# Patient Record
Sex: FEMALE | Race: OTHER | HISPANIC OR LATINO | URBAN - METROPOLITAN AREA
[De-identification: names, ages, dates, MRNs, and addresses within clinical notes are randomized per-mention and may not be internally consistent; named-entity substitution may affect disease eponyms.]

---

## 2023-05-05 ENCOUNTER — INPATIENT (INPATIENT)
Facility: HOSPITAL | Age: 38
LOS: 1 days | Discharge: HOME | DRG: 93 | End: 2023-05-07
Attending: PSYCHIATRY & NEUROLOGY | Admitting: PSYCHIATRY & NEUROLOGY
Payer: MEDICAID

## 2023-05-05 VITALS
TEMPERATURE: 99 F | RESPIRATION RATE: 18 BRPM | HEIGHT: 66 IN | HEART RATE: 88 BPM | SYSTOLIC BLOOD PRESSURE: 145 MMHG | DIASTOLIC BLOOD PRESSURE: 94 MMHG | OXYGEN SATURATION: 100 % | WEIGHT: 179.9 LBS

## 2023-05-05 DIAGNOSIS — R20.0 ANESTHESIA OF SKIN: ICD-10-CM

## 2023-05-05 LAB
ALBUMIN SERPL ELPH-MCNC: 4.9 G/DL — SIGNIFICANT CHANGE UP (ref 3.3–5)
ALP SERPL-CCNC: 57 U/L — SIGNIFICANT CHANGE UP (ref 40–120)
ALT FLD-CCNC: 17 U/L — SIGNIFICANT CHANGE UP (ref 10–45)
ANION GAP SERPL CALC-SCNC: 13 MMOL/L — SIGNIFICANT CHANGE UP (ref 5–17)
ANISOCYTOSIS BLD QL: SLIGHT — SIGNIFICANT CHANGE UP
APTT BLD: 30.7 SEC — SIGNIFICANT CHANGE UP (ref 27.5–35.5)
AST SERPL-CCNC: 15 U/L — SIGNIFICANT CHANGE UP (ref 10–40)
BASE EXCESS BLDV CALC-SCNC: -3.6 MMOL/L — LOW (ref -2–3)
BASOPHILS # BLD AUTO: 0.08 K/UL — SIGNIFICANT CHANGE UP (ref 0–0.2)
BASOPHILS NFR BLD AUTO: 0.9 % — SIGNIFICANT CHANGE UP (ref 0–2)
BILIRUB SERPL-MCNC: 0.2 MG/DL — SIGNIFICANT CHANGE UP (ref 0.2–1.2)
BUN SERPL-MCNC: 9 MG/DL — SIGNIFICANT CHANGE UP (ref 7–23)
CA-I SERPL-SCNC: 1.26 MMOL/L — SIGNIFICANT CHANGE UP (ref 1.15–1.33)
CALCIUM SERPL-MCNC: 9.7 MG/DL — SIGNIFICANT CHANGE UP (ref 8.4–10.5)
CHLORIDE BLDV-SCNC: 99 MMOL/L — SIGNIFICANT CHANGE UP (ref 96–108)
CHLORIDE SERPL-SCNC: 101 MMOL/L — SIGNIFICANT CHANGE UP (ref 96–108)
CO2 BLDV-SCNC: 24 MMOL/L — SIGNIFICANT CHANGE UP (ref 22–26)
CO2 SERPL-SCNC: 21 MMOL/L — LOW (ref 22–31)
CREAT SERPL-MCNC: 0.49 MG/DL — LOW (ref 0.5–1.3)
CRP SERPL-MCNC: <3 MG/L — SIGNIFICANT CHANGE UP (ref 0–4)
DACRYOCYTES BLD QL SMEAR: SLIGHT — SIGNIFICANT CHANGE UP
EGFR: 126 ML/MIN/1.73M2 — SIGNIFICANT CHANGE UP
EOSINOPHIL # BLD AUTO: 0.24 K/UL — SIGNIFICANT CHANGE UP (ref 0–0.5)
EOSINOPHIL NFR BLD AUTO: 2.6 % — SIGNIFICANT CHANGE UP (ref 0–6)
ETHANOL SERPL-MCNC: <10 MG/DL — SIGNIFICANT CHANGE UP (ref 0–10)
GAS PNL BLDV: 134 MMOL/L — LOW (ref 136–145)
GAS PNL BLDV: SIGNIFICANT CHANGE UP
GLUCOSE BLDV-MCNC: 73 MG/DL — SIGNIFICANT CHANGE UP (ref 70–99)
GLUCOSE SERPL-MCNC: 95 MG/DL — SIGNIFICANT CHANGE UP (ref 70–99)
HCO3 BLDV-SCNC: 23 MMOL/L — SIGNIFICANT CHANGE UP (ref 22–29)
HCT VFR BLD CALC: 37.3 % — SIGNIFICANT CHANGE UP (ref 34.5–45)
HCT VFR BLDA CALC: 36 % — SIGNIFICANT CHANGE UP (ref 34.5–46.5)
HGB BLD CALC-MCNC: 12 G/DL — SIGNIFICANT CHANGE UP (ref 11.7–16.1)
HGB BLD-MCNC: 11.5 G/DL — SIGNIFICANT CHANGE UP (ref 11.5–15.5)
INR BLD: 1.16 RATIO — SIGNIFICANT CHANGE UP (ref 0.88–1.16)
LACTATE BLDV-MCNC: 2.6 MMOL/L — HIGH (ref 0.5–2)
LYMPHOCYTES # BLD AUTO: 1.76 K/UL — SIGNIFICANT CHANGE UP (ref 1–3.3)
LYMPHOCYTES # BLD AUTO: 19.3 % — SIGNIFICANT CHANGE UP (ref 13–44)
MAGNESIUM SERPL-MCNC: 2 MG/DL — SIGNIFICANT CHANGE UP (ref 1.6–2.6)
MANUAL SMEAR VERIFICATION: SIGNIFICANT CHANGE UP
MCHC RBC-ENTMCNC: 24.4 PG — LOW (ref 27–34)
MCHC RBC-ENTMCNC: 30.8 GM/DL — LOW (ref 32–36)
MCV RBC AUTO: 79.2 FL — LOW (ref 80–100)
MICROCYTES BLD QL: SLIGHT — SIGNIFICANT CHANGE UP
MONOCYTES # BLD AUTO: 0.32 K/UL — SIGNIFICANT CHANGE UP (ref 0–0.9)
MONOCYTES NFR BLD AUTO: 3.5 % — SIGNIFICANT CHANGE UP (ref 2–14)
NEUTROPHILS # BLD AUTO: 6.73 K/UL — SIGNIFICANT CHANGE UP (ref 1.8–7.4)
NEUTROPHILS NFR BLD AUTO: 73.7 % — SIGNIFICANT CHANGE UP (ref 43–77)
OVALOCYTES BLD QL SMEAR: SLIGHT — SIGNIFICANT CHANGE UP
PCO2 BLDV: 45 MMHG — HIGH (ref 39–42)
PH BLDV: 7.31 — LOW (ref 7.32–7.43)
PHOSPHATE SERPL-MCNC: 3.9 MG/DL — SIGNIFICANT CHANGE UP (ref 2.5–4.5)
PLAT MORPH BLD: NORMAL — SIGNIFICANT CHANGE UP
PLATELET # BLD AUTO: 216 K/UL — SIGNIFICANT CHANGE UP (ref 150–400)
PO2 BLDV: 39 MMHG — SIGNIFICANT CHANGE UP (ref 25–45)
POIKILOCYTOSIS BLD QL AUTO: SLIGHT — SIGNIFICANT CHANGE UP
POLYCHROMASIA BLD QL SMEAR: SLIGHT — SIGNIFICANT CHANGE UP
POTASSIUM BLDV-SCNC: 4 MMOL/L — SIGNIFICANT CHANGE UP (ref 3.5–5.1)
POTASSIUM SERPL-MCNC: 3.9 MMOL/L — SIGNIFICANT CHANGE UP (ref 3.5–5.3)
POTASSIUM SERPL-SCNC: 3.9 MMOL/L — SIGNIFICANT CHANGE UP (ref 3.5–5.3)
PROT SERPL-MCNC: 7.9 G/DL — SIGNIFICANT CHANGE UP (ref 6–8.3)
PROTHROM AB SERPL-ACNC: 13.5 SEC — HIGH (ref 10.5–13.4)
RBC # BLD: 4.71 M/UL — SIGNIFICANT CHANGE UP (ref 3.8–5.2)
RBC # FLD: SIGNIFICANT CHANGE UP (ref 10.3–14.5)
RBC BLD AUTO: ABNORMAL
SAO2 % BLDV: 60.2 % — LOW (ref 67–88)
SODIUM SERPL-SCNC: 135 MMOL/L — SIGNIFICANT CHANGE UP (ref 135–145)
TROPONIN T, HIGH SENSITIVITY RESULT: 6 NG/L — SIGNIFICANT CHANGE UP (ref 0–51)
WBC # BLD: 9.13 K/UL — SIGNIFICANT CHANGE UP (ref 3.8–10.5)
WBC # FLD AUTO: 9.13 K/UL — SIGNIFICANT CHANGE UP (ref 3.8–10.5)

## 2023-05-05 PROCEDURE — 99291 CRITICAL CARE FIRST HOUR: CPT

## 2023-05-05 PROCEDURE — 70496 CT ANGIOGRAPHY HEAD: CPT | Mod: 26,MA

## 2023-05-05 PROCEDURE — 0042T: CPT | Mod: MA

## 2023-05-05 PROCEDURE — 70498 CT ANGIOGRAPHY NECK: CPT | Mod: 26,MA

## 2023-05-05 RX ORDER — METOCLOPRAMIDE HCL 10 MG
10 TABLET ORAL ONCE
Refills: 0 | Status: COMPLETED | OUTPATIENT
Start: 2023-05-05 | End: 2023-05-05

## 2023-05-05 RX ORDER — ENOXAPARIN SODIUM 100 MG/ML
40 INJECTION SUBCUTANEOUS EVERY 24 HOURS
Refills: 0 | Status: DISCONTINUED | OUTPATIENT
Start: 2023-05-05 | End: 2023-05-07

## 2023-05-05 RX ORDER — ASPIRIN/CALCIUM CARB/MAGNESIUM 324 MG
81 TABLET ORAL ONCE
Refills: 0 | Status: COMPLETED | OUTPATIENT
Start: 2023-05-05 | End: 2023-05-05

## 2023-05-05 RX ADMIN — Medication 10 MILLIGRAM(S): at 17:29

## 2023-05-05 RX ADMIN — Medication 81 MILLIGRAM(S): at 17:29

## 2023-05-05 NOTE — H&P ADULT - ATTENDING COMMENTS
Ms. Lima is a 37 year old right handed woman with a PMHx significant for anemia has presented to the ED for bilateral hands feels cold and face was feeling host and transient episode of L lower facial heaviness. During my evaluation, patient's boyfriend was present. I appreciate that. Patient reported she is feeling normal back to baseline. No new episodes or new neurology symptoms reported.  On further, questioning, patient confirmed that  she denies personal and family history of autoimmune disease, including MS, NMO antibody disorder or other neurologic disorder.    Detailed neuro exam:  General: Patient is comfortably lying on the bed without acute distress.  Mental and Psychological Status: The patient is alert and oriented to person, place and exact date. Follows simple and complex commands. Speech is fluent, comprehension, naming and repetition are intact. Relates personal medical history clearly and with detail.  Cranial Nerve Exam: Visual fields are full to confrontation. Pupils are round, equal, and reactive. Extraocular movements are full. V1-V3 are intact to light touch. There is no facial weakness or asymmetry. Hearing is grossly intact. Palate elevation is symmetric. Shoulder shrug is 5/5 bilaterally. Tongue protrusion is midline. There is no dysarthria.  Motor Exam: Bulk, tone, and strength are normal. There is no pronator drift. There are no resting tremors.  Sensory Examination: Sensation is intact to light touch throughout.  Deep Tendon Reflexes and Plantar Responses: 2+ throughout.   Posture, Station and Gait: Station and gait, including heel and toe walking are normal. Posture are normal.  Coordination: There is no dysmetria or ataxia with finger-nose-finger or heel-knee-shin. No intention tremors are present.  Ms. Lima is a 37 year old right handed woman with a PMHx significant for anemia has presented to the ED for bilateral hands feels cold and face was feeling host and transient episode of L lower facial heaviness. Etiology of these symptoms remains uncertain. Clinically less suspicious for stroke or Demyelinating disease.  Clinically she is back to baseline. I advised for out patient MRI brain but she would like do as inpatient before discharge.  No need for MRI C/T spine,   MRI Brain,   Continue medical management, neuro- check and fall precaution.  GI and DVT prophylaxis    I discussed the diagnosis, treatment plan with the patient. All questions and concerns were addressed. The patient demonstrated good understanding of the treatment plan.

## 2023-05-05 NOTE — ED ADULT NURSE NOTE - NIH STROKE SCALE: 8. SENSORY, QM
[Normal LUE] : Left Upper Extremity: No scars, rashes, lesions, ulcers, skin intact [Normal Finger/nose] : finger to nose coordination [Normal] : no peripheral adenopathy appreciated [de-identified] : left hand:\par Skin intact moderate swelling of the thumb and index fingers\par Deformity of the digits consistent with arthritis/degenerative changes\par Range of motion of the digits slightly limited, at baseline\par Neurovascular intact distally [de-identified] : x-rays from an urgent care I reviewed there is a age indeterminate fracture of the second metacarpal head as well as subluxation of the thumb MCP joint [de-identified] : iraisr (1) Mild-to-moderate sensory loss; patient feels pinprick is less sharp or is dull on the affected side; or there is a loss of superficial pain with pinprick, but patient is aware of being touched

## 2023-05-05 NOTE — H&P ADULT - NSHPPHYSICALEXAM_GEN_ALL_CORE
Physical Examination:   General - NAD  Cardiovascular - Peripheral pulses palpable, no edema  Eyes -  Fundoscopy not performed due to safety precautions in the setting of the COVID-19 pandemic    Neurologic Exam:  Mental status - Awake, Alert, Oriented to person, place, and time. Speech fluent, repetition and naming intact. Follows simple and complex commands. Attention/concentration, recent and remote memory (including registration and recall), and fund of knowledge intact    Cranial nerves - PERRLA, VFF, EOMI, decreased L V2/V3 sensation,  L nasiolabial fold flattening noted, hearing intact b/l, palate with symmetric elevation,  sternocleidomastiod 5/5 strength b/l, tongue midline on protrusion with full lateral movement    Motor - Normal bulk and tone throughout. No pronator drift.  Strength testing  No drift in extremities - full strength through out.     Sensation - Decreased to light touch in LLE.     Coordination - Finger to Nose intact b/l. No tremors appreciated    Reflexes +2 throughout, toes down going.    Gait and station - Normal casual gait.

## 2023-05-05 NOTE — H&P ADULT - ASSESSMENT
HPI: Patient ANY ALLRED is a 37y (1985) woman with a PMHx significant for anemia has presented to the ED as a code stroke for L sided paresthesias and L lower facial heaviness. PT states she noticed at 9 AM 5/5/23 (LKW) that her left hand went numb for 5 minutes after which it resolved. Then at 2 PM, she states she felt the left side of her face being heavy and since then had this symptom. Pt denies any recent HA, n/v, changes in vision/hearing, trauma. Not on AC/AP at home. No hx of headaches. Pt is not a tpa candidate as her symptoms are nondisabling and pt outside tenecteplase window. O    Of note, pt states that she had similar symptoms in the past for which she went to [facility] and has CT head there (reportedly negative, per her). Pt states she never had an MRI of her brain.     NIH 2 (L lower facial droop- mild, numbness left side- V2/V3 and left leg)  preMRS 0    CT head negative for acute findings per radiology.     Impression: L sided sensory motor symptoms possibly 2/2 demyelinating disease vs. hemiplegic migraine (w/o HA) vs R brain stroke  vs. other etiology. Unclear mechanism at this time. Given young age and no stroke risk factors and reported recurrent episodes, acute stroke may be possibly lower on the differential but cannot absolutely rule out at this time.     Recommendations:  -Admit to general neurology under Dr. Tom  Imaging:  -MRI brain w and w/o contrast, C/T/L spine w and w/o contrast, if no contraindications  -Can consider STAT CT head non-contrast for any change in neuro exam    Secondary prevention of stroke:  -One dose ASA now.  -Tight glucose control (long-term goal HgbA1c < 6%)    Misc/additional recs:  -toxic/metabolic/infectious w/u per primary team  -Dysphagia screen  -Speech and swallow eval if dysphagia screen fails  -NPO except meds until dysphagia screen passed  -Head of bed > 30 degrees for aspiration prevention   -Continuous  telemetry to monitor for arrhythmia  -Stroke labwork: HgbA1C, fasting lipid panel, CBC, CMP, coag pannel, troponin  -Neuro checks Q4  -PT/OT  -DVT Prophylaxis: Lovenox 40 mg Sq daily unless contraindicated in which case SCDs   Fall precautions, aspiration precautions    Case discussed with stroke fellow and Dr. Carlton. To be discussed with general neurology attending and will be formally staffed by attending during morning rounds. Recommendations will be finalized once signed by attending.        HPI: Patient ANY ALLRED is a 37y (1985) woman with a PMHx significant for anemia has presented to the ED as a code stroke for L sided paresthesias and L lower facial heaviness. PT states she noticed at 9 AM 5/5/23 (LKW) that her left hand went numb for 5 minutes after which it resolved. Then at 2 PM, she states she felt the left side of her face being heavy and since then had this symptom. In addition, since around that time has had LLE numbness. Pt denies any recent HA, n/v, changes in vision/hearing, trauma. Not on AC/AP at home. No hx of headaches. Pt is not a tpa candidate as her symptoms are nondisabling and pt outside tenecteplase window.     Of note, pt states that she had similar symptoms in the past for which she went to [facility] and has CT head there (reportedly negative, per her). Pt states she never had an MRI of her brain.     NIH 2 (L lower facial droop- mild, numbness left side- V2/V3 and left leg)  preMRS 0    CT head negative for acute findings per radiology.     Impression: L sided sensory motor symptoms possibly 2/2 demyelinating disease vs. hemiplegic migraine (w/o HA) vs R brain stroke  vs. other etiology. Unclear mechanism at this time. Given young age and no stroke risk factors and reported recurrent episodes, acute stroke may be possibly lower on the differential but cannot absolutely rule out at this time.     Recommendations:  -Admit to general neurology under Dr. Tom  Imaging:  -MRI brain w and w/o contrast, C/T/L spine w and w/o contrast, if no contraindications  -Can consider STAT CT head non-contrast for any change in neuro exam    Secondary prevention of stroke:  -One dose ASA now.  -Tight glucose control (long-term goal HgbA1c < 6%)    Misc/additional recs:  -toxic/metabolic/infectious w/u per primary team  -Dysphagia screen  -Speech and swallow eval if dysphagia screen fails  -NPO except meds until dysphagia screen passed  -Head of bed > 30 degrees for aspiration prevention   -Continuous  telemetry to monitor for arrhythmia  -Stroke labwork: HgbA1C, fasting lipid panel, CBC, CMP, coag pannel, troponin  -Neuro checks Q4  -PT/OT  -DVT Prophylaxis: Lovenox 40 mg Sq daily unless contraindicated in which case SCDs   Fall precautions, aspiration precautions    Case discussed with stroke fellow and Dr. Carlton. To be discussed with general neurology attending and will be formally staffed by attending during morning rounds. Recommendations will be finalized once signed by attending.

## 2023-05-05 NOTE — H&P ADULT - HISTORY OF PRESENT ILLNESS
HPI: Patient ANY ALLRED is a 37y (1985) woman with a PMHx significant for anemia has presented to the ED as a code stroke for L sided paresthesias and L lower facial heaviness. PT states she noticed at 9 AM 5/5/23 (LKW) that her left hand went numb for 5 minutes after which it resolved. Then at 2 PM, she states she felt the left side of her face being heavy and since then had this symptom. Pt denies any recent HA, n/v, changes in vision/hearing, trauma. Not on AC/AP at home. No hx of headaches. Pt is not a tpa candidate as her symptoms are nondisabling and pt outside tenecteplase window.     Of note, pt states that she had similar symptoms in the past for which she went to [facility] and has CT head there (reportedly negative, per her). Pt states she never had an MRI of her brain.     NIH 2 (L lower facial droop- mild, numbness left side)  preMRS 0 HPI: Patient ANY ALLRED is a 37y (1985) woman with a PMHx significant for anemia has presented to the ED as a code stroke for L sided paresthesias and L lower facial heaviness. PT states she noticed at 9 AM 5/5/23 (LKW) that her left hand went numb for 5 minutes after which it resolved. Then at 2 PM, she states she felt the left side of her face being heavy and since then had this symptom. In addition, since around that time has had LLE numbness. Pt denies any recent HA, n/v, changes in vision/hearing, trauma. Not on AC/AP at home. No hx of headaches. Pt is not a tpa candidate as her symptoms are nondisabling and pt outside tenecteplase window.     Of note, pt states that she had similar symptoms in the past for which she went to [facility] and has CT head there (reportedly negative, per her). Pt states she never had an MRI of her brain.     NIH 2 (L lower facial droop- mild, numbness left side)  preMRS 0

## 2023-05-05 NOTE — ED PROVIDER NOTE - NS ED ROS FT
Gen: Denies fever, weight loss  HEENT: Denies vision changes, ear pain, epistaxis, sore throat  CV: Denies chest pain, palpitations  Skin: Denies rash, erythema, color changes  Resp: Denies SOB, cough  Endo: Denies sensitivity to heat, cold, increased urination  GI: Denies abdominal pain, constipation, nausea, vomiting, diarrhea  Msk: Denies back pain, LE swelling, extremity pain  : Denies dysuria, increased frequency  Neuro: Denies LOC; + weakness, +numbness  Psych: Denies hx of psych, hallucinations  ROS statement: all other ROS negative except as per HPI

## 2023-05-05 NOTE — H&P ADULT - NSHPLABSRESULTS_GEN_ALL_CORE
INTERPRETATION:  CLINICAL INDICATION: Left-sided decreased sensation    5mm axial sections of the brain were obtained from base to vertex,   without the intravenous administration of contrast material. Coronal and   sagittal computer generated reconstructed views are available.    No prior brain imaging is available for comparison        The fourth, third and lateral ventricles are normal size and position.   There is no hemorrhage, mass or shift of the midline structures. There is   normal gray white matter differentiation. Bone window examination is   unremarkable.    CT perfusion:    After the intravenous administration of 50 cc of Omnipaque 300 serial   thin sections were obtained through the brain for the purposes of   evaluating CT perfusion, raw data was sent to the [employer] is software for   postprocessing.    No core infarct or delayed mean transit time is identified.    CBF<30% volume: 0 ml  Tmax> 6.0s volume: 0 ml  Mismatch volume: 0 ml  Mismatch ratio: none    CTA head and neck:        After the intravenous power injection of 70 cc of Omnipaque 300 using a   bolus deanna timing run serial thin sections were obtained through the   neck from the thoracic inlet through the intracranial circulation   centered at the [id number] on a multislice CT scanner reformatted   with coronal and sagittal 2 D-MIP projections, including 3 D   reconstructions using a separate 3D [employer] software workstation.A total   of 120 cc of Omnipaque were intravenously injected. 80 cc were discarded.    The origins of the carotid and vertebral arteries are normal.  The   vertebral arteries are codominant. The carotid bifurcations are normal   bilaterally.     The distal vertebral arteries are well identified as are the   posterior-inferior cerebellar arteries bilaterally. The region of the   vertebral basilar junction is normal. The basilar artery is normal.The   posterior cerebral and superior cerebellar arteries are normal.    Evaluation of the carotid arteries demonstrate normal appearance to the   distal cervical, petrous, cavernous and supraclinoid internal carotid   arteries. The anterior cerebralarteries, anterior communicating artery   and middle cerebral arteries are normal.        There is no evidence of aneurysm, stenosis, or vessel occlusion.     The normal intracranial venous circulation is identified. The left   transverse sinus is dominant. The superior sagittal sinus, internal   cerebral veins, vein of Praveen, straight sinus, transverse sinuses,   sigmoid sinuses and internal jugular veins are normal. Cortical veins are   normal.    IMPRESSION:  Unremarkable noncontrast brain CT.Normal CTA of the head and neck.   Normal CT perfusion.

## 2023-05-05 NOTE — ED PROVIDER NOTE - PROGRESS NOTE DETAILS
Charly Taylor PGY3: CTs unremarkable. Per neuro would like MRI to asses for demyelinating disease. Will admit to general neuro floor.

## 2023-05-05 NOTE — ED PROVIDER NOTE - PHYSICAL EXAMINATION
PHYSICAL EXAM:  GENERAL: non-toxic appearing; in no respiratory distress  HEENT: Atraumatic, Normocephalic, PERRL, EOMs intact b/l w/out deficits, no conjunctival pallor, MMM  NECK: No JVD; FROM  CHEST/LUNG: CTAB no wheezes/rhonchi/rales  HEART: RRR no murmur/gallops/rubs  ABDOMEN: +BS, soft, NT, ND  EXTREMITIES: No LE edema, +2 radial pulses b/l, +2 DP/PT pulses b/l  MUSCULOSKELETAL: FROM of all 4 extremities  NERVOUS SYSTEM:  A&Ox3, decreased sensation in LLE; Lt facial droop; nml strength throughout.   SKIN:  No new rashes

## 2023-05-05 NOTE — ED ADULT NURSE NOTE - OBJECTIVE STATEMENT
37 year old female coming in for facial numbness. As per patient at 9:30 this morning she developed numbness in her left hand which only lasted about 5 minutes. Then around 2pm the patient noted left sided facial numbness and heaviness. On arrival a code stroke was called @1540. No shortness of breath or difficulty breathing. No chest pain, pressure or palpitations. No abdominal pain, nausea or vomiting. No fever, chills, or body aches.

## 2023-05-05 NOTE — ED ADULT TRIAGE NOTE - CHIEF COMPLAINT QUOTE
2PM left sided facial droop , resolved after 5 min, left sided facial & arm numbness, left eyelid "heaviness"

## 2023-05-05 NOTE — CONSULT NOTE ADULT - SUBJECTIVE AND OBJECTIVE BOX
Neurology - Consult Note    -  Spectra: [phone] ([facility]), [phone] ([facility])  -    HPI: Patient ANY ALLRED is a 37y (1985) woman with a PMHx significant for anemia has presented to the ED as a code stroke for L sided paresthesias and L lower facial heaviness. PT states she noticed at 9 AM 5/5/23 (LKW) that her left hand went numb for 5 minutes after which it resolved. Then at 2 PM, she states she felt the left side of her face being heavy and since then had this symptom. Pt denies any recent HA, n/v, changes in vision/hearing, trauma. Not on AC/AP at home. Pt is not a tpa candidate as her symptoms are nondisabling and pt outside tenecteplase window. O    Of note, pt states that she had similar symptoms in the past for which she went to [facility] and has CT head there (reportedly negative, per her). Pt states she never had an MRI of her brain.     NIH 2  preMRS 0    Review of Systems:   CONSTITUTIONAL: No fevers or chills  EYES AND ENT: No visual changes or no throat pain   NEUROLOGICAL: +As stated in HPI above  SKIN: No itching, burning, rashes, or lesions   All other review of systems is negative unless indicated above.    Allergies:  No Known Allergies      PMHx/PSHx/Family Hx: As above, otherwise see below       Social Hx:  No current use of tobacco, alcohol, or illicit drugs    Medications:  MEDICATIONS  (STANDING):    MEDICATIONS  (PRN):      Vitals:  T(C): 37.3 (05-05-23 @ 15:39), Max: 37.3 (05-05-23 @ 15:39)  HR: 88 (05-05-23 @ 15:39) (88 - 88)  BP: 145/94 (05-05-23 @ 15:39) (145/94 - 145/94)  RR: 18 (05-05-23 @ 15:39) (18 - 18)  SpO2: 100% (05-05-23 @ 15:39) (100% - 100%)    Physical Examination:   General - NAD  Cardiovascular - Peripheral pulses palpable, no edema  Eyes -  Fundoscopy not performed due to safety precautions in the setting of the COVID-19 pandemic    Neurologic Exam:  Mental status - Awake, Alert, Oriented to person, place, and time. Speech fluent, repetition and naming intact. Follows simple and complex commands. Attention/concentration, recent and remote memory (including registration and recall), and fund of knowledge intact    Cranial nerves - PERRLA, VFF, EOMI, decreased L V2/V3 sensation,  L nasiolabial fold flattening noted, hearing intact b/l, palate with symmetric elevation,  sternocleidomastiod 5/5 strength b/l, tongue midline on protrusion with full lateral movement    Motor - Normal bulk and tone throughout. No pronator drift.  Strength testing  No drift in extremities - full strength through out.     Sensation - Decreased to light touch in LLE.     Coordination - Finger to Nose intact b/l. No tremors appreciated    Gait and station - Normal casual gait.     Labs:          CAPILLARY BLOOD GLUCOSE      POCT Blood Glucose.: 97 mg/dL (05 May 2023 15:38)          CSF:                  Radiology:     Neurology - Consult Note    -  Spectra: [phone] ([facility]), [phone] ([facility])  -    HPI: Patient ANY ALLRED is a 37y (1985) woman with a PMHx significant for anemia has presented to the ED as a code stroke for L sided paresthesias and L lower facial heaviness. PT states she noticed at 9 AM 5/5/23 (LKW) that her left hand went numb for 5 minutes after which it resolved. Then at 2 PM, she states she felt the left side of her face being heavy and since then had this symptom. Pt denies any recent HA, n/v, changes in vision/hearing, trauma. Not on AC/AP at home. No hx of headaches. Pt is not a tpa candidate as her symptoms are nondisabling and pt outside tenecteplase window.     Of note, pt states that she had similar symptoms in the past for which she went to [facility] and has CT head there (reportedly negative, per her). Pt states she never had an MRI of her brain.     NIH 2  preMRS 0    Review of Systems:   CONSTITUTIONAL: No fevers or chills  EYES AND ENT: No visual changes or no throat pain   NEUROLOGICAL: +As stated in HPI above  SKIN: No itching, burning, rashes, or lesions   All other review of systems is negative unless indicated above.    Allergies:  No Known Allergies      PMHx/PSHx/Family Hx: As above, otherwise see below       Social Hx:  No current use of tobacco, alcohol, or illicit drugs    Medications:  MEDICATIONS  (STANDING):    MEDICATIONS  (PRN):      Vitals:  T(C): 37.3 (05-05-23 @ 15:39), Max: 37.3 (05-05-23 @ 15:39)  HR: 88 (05-05-23 @ 15:39) (88 - 88)  BP: 145/94 (05-05-23 @ 15:39) (145/94 - 145/94)  RR: 18 (05-05-23 @ 15:39) (18 - 18)  SpO2: 100% (05-05-23 @ 15:39) (100% - 100%)    Physical Examination:   General - NAD  Cardiovascular - Peripheral pulses palpable, no edema  Eyes -  Fundoscopy not performed due to safety precautions in the setting of the COVID-19 pandemic    Neurologic Exam:  Mental status - Awake, Alert, Oriented to person, place, and time. Speech fluent, repetition and naming intact. Follows simple and complex commands. Attention/concentration, recent and remote memory (including registration and recall), and fund of knowledge intact    Cranial nerves - PERRLA, VFF, EOMI, decreased L V2/V3 sensation,  L nasiolabial fold flattening noted, hearing intact b/l, palate with symmetric elevation,  sternocleidomastiod 5/5 strength b/l, tongue midline on protrusion with full lateral movement    Motor - Normal bulk and tone throughout. No pronator drift.  Strength testing  No drift in extremities - full strength through out.     Sensation - Decreased to light touch in LLE.     Coordination - Finger to Nose intact b/l. No tremors appreciated    Gait and station - Normal casual gait.     Labs:          CAPILLARY BLOOD GLUCOSE      POCT Blood Glucose.: 97 mg/dL (05 May 2023 15:38)          CSF:                  Radiology:

## 2023-05-05 NOTE — ED PROVIDER NOTE - ATTENDING CONTRIBUTION TO CARE
used, Randy #[id number] at 1531.   Pt presents, daughter at bedside, with 2 episodes of numbness, first episode 6hrs prior to arrival, resolved after 5 minutes, involved L hand with numbness, resolved on its own.  Then 1hr prior to arrival, noted left lower face numbness, L eyelid (couldn't understand symptom), and L arm numbness.  This has improved since then.  No diplopia, dysphagia, dysarthria, ataxia, focal weakness in arm or leg.     PE: see neuro exam, done concurrently with ED team during code stroke.  Pt awake, alert, NAD, speech clear, CN intact, no respiratory distress.     MDM: acute L side sensory change, not a candidate for thrombolytics due to onset >4.5hrs, cth, ct angio, neuro consult, check cbc r/o anemia or leukocytosis, check bmp to r/o metabolic derangement and lyte imbalance.

## 2023-05-05 NOTE — ED PROVIDER NOTE - OBJECTIVE STATEMENT
36 y/o F, PMH of anemia, presents to ED as code stroke for L-sided parasthesias and facial numbness. LKW was ar 9 AM; states at that time first noticed Lt hand numbness for ~5min before self-resolving. Then at 2PM she states Lt side of face became heavy and numb, but also noted LLE numbness. Pt denies HA, vision changes, CP, SOB, abd pain, n/v/d, urinary sx, or any other symptoms at this time.

## 2023-05-05 NOTE — ED PROVIDER NOTE - CLINICAL SUMMARY MEDICAL DECISION MAKING FREE TEXT BOX
36 y/o F, PMH of anemia, presents to ED as code stroke for L-sided parasthesias and facial numbness. LKW was ar 9 AM; No AC.    VSS. Physical exam significant for Lt facial droop and LLE decreased sensation.    Neuro at bedside for eval. Will perform stroke w/u. Pt not a TNK candidate at this time. Dispo per results and neuro recs.

## 2023-05-06 LAB
ANION GAP SERPL CALC-SCNC: 12 MMOL/L — SIGNIFICANT CHANGE UP (ref 5–17)
BUN SERPL-MCNC: 7 MG/DL — SIGNIFICANT CHANGE UP (ref 7–23)
CALCIUM SERPL-MCNC: 9.5 MG/DL — SIGNIFICANT CHANGE UP (ref 8.4–10.5)
CHLORIDE SERPL-SCNC: 104 MMOL/L — SIGNIFICANT CHANGE UP (ref 96–108)
CO2 SERPL-SCNC: 22 MMOL/L — SIGNIFICANT CHANGE UP (ref 22–31)
CREAT SERPL-MCNC: 0.5 MG/DL — SIGNIFICANT CHANGE UP (ref 0.5–1.3)
EGFR: 125 ML/MIN/1.73M2 — SIGNIFICANT CHANGE UP
GLUCOSE SERPL-MCNC: 70 MG/DL — SIGNIFICANT CHANGE UP (ref 70–99)
HCT VFR BLD CALC: 36.2 % — SIGNIFICANT CHANGE UP (ref 34.5–45)
HGB BLD-MCNC: 11.3 G/DL — LOW (ref 11.5–15.5)
MCHC RBC-ENTMCNC: 25.3 PG — LOW (ref 27–34)
MCHC RBC-ENTMCNC: 31.2 GM/DL — LOW (ref 32–36)
MCV RBC AUTO: 81.2 FL — SIGNIFICANT CHANGE UP (ref 80–100)
NRBC # BLD: 0 /100 WBCS — SIGNIFICANT CHANGE UP (ref 0–0)
PLATELET # BLD AUTO: 205 K/UL — SIGNIFICANT CHANGE UP (ref 150–400)
POTASSIUM SERPL-MCNC: 3.9 MMOL/L — SIGNIFICANT CHANGE UP (ref 3.5–5.3)
POTASSIUM SERPL-SCNC: 3.9 MMOL/L — SIGNIFICANT CHANGE UP (ref 3.5–5.3)
RBC # BLD: 4.46 M/UL — SIGNIFICANT CHANGE UP (ref 3.8–5.2)
RBC # FLD: SIGNIFICANT CHANGE UP (ref 10.3–14.5)
SODIUM SERPL-SCNC: 138 MMOL/L — SIGNIFICANT CHANGE UP (ref 135–145)
WBC # BLD: 8.4 K/UL — SIGNIFICANT CHANGE UP (ref 3.8–10.5)
WBC # FLD AUTO: 8.4 K/UL — SIGNIFICANT CHANGE UP (ref 3.8–10.5)

## 2023-05-06 PROCEDURE — 99222 1ST HOSP IP/OBS MODERATE 55: CPT | Mod: GC

## 2023-05-06 NOTE — PHYSICAL THERAPY INITIAL EVALUATION ADULT - ACTIVE RANGE OF MOTION EXAMINATION, REHAB EVAL
pablito. upper extremity Active ROM was WNL (within normal limits)/bilateral lower extremity Active ROM was WNL (within normal limits)

## 2023-05-06 NOTE — PHYSICAL THERAPY INITIAL EVALUATION ADULT - ADDITIONAL COMMENTS
Pt reports she lives with spouse and three teenage daughters in house with flight of stairs +HR. Prior to admission pt independent with all functional mobility including ambulation without AD.

## 2023-05-06 NOTE — PHYSICAL THERAPY INITIAL EVALUATION ADULT - PERTINENT HX OF CURRENT PROBLEM, REHAB EVAL
37y female with PMHx significant for anemia presents to ED as a code stroke for L sided paresthesias and L lower facial heaviness. PT states she noticed at 9 AM 5/5/23 (LKW) that her left hand went numb for 5 minutes after which it resolved. Then at 2 PM, she states she felt the left facial "heaviness" and LLE numbness. Pt denies any recent HA, n/v, changes in vision/hearing, trauma. Not on AC/AP at home. No hx of headaches. Pt is not a tpa candidate as her symptoms are nondisabling and pt outside tenecteplase window. NIH 2 (L lower facial droop- mild, numbness left side- V2/V3 and left leg), preMRS 0.  Of note, pt states that she had similar symptoms in the past for which she went to [facility] and had CT head.  Per Neuro, L sided sensory motor symptoms possibly due to demyelinating disease vs. hemiplegic migraine (w/o HA) vs R brain stroke  vs. other etiology.    5/0/5/2023: CT BRAIN: Unremarkable noncontrast brain CT. Normal CTA of the head and neck. Normal CT perfusion.

## 2023-05-06 NOTE — OCCUPATIONAL THERAPY INITIAL EVALUATION ADULT - PERTINENT HX OF CURRENT PROBLEM, REHAB EVAL
36 y/o F, PMH of anemia, presents to ED as code stroke for L-sided parasthesias and facial numbness. LKW was ar 9 AM; No AC. VSS. Physical exam significant for Lt facial droop and LLE decreased sensation.    Of note, pt states that she had similar symptoms in the past for which she went to [facility] and has CT head there (reportedly negative, per her). Pt states she never had an MRI of her brain.     NIH 2 (L lower facial droop- mild, numbness left side- V2/V3 and left leg)  preMRS 0    CT head negative for acute findings per radiology.

## 2023-05-06 NOTE — PATIENT PROFILE ADULT - FALL HARM RISK - HARM RISK INTERVENTIONS

## 2023-05-06 NOTE — CHART NOTE - NSCHARTNOTEFT_GEN_A_CORE
Neurology    Patient seen evaluated bedside with neuro attending Dr Tom, who will attest prior day H&P with today's plan. Patient reports symptoms have remained resolved while being here. Given neuro exam is grossly unrevealing now, will obtain MRI brain while inpatient and defer C/T/L spine after discussion with neuro attending. Will therefore hold off on steroids/ antiplatelet agents given unclear etiology of resolved symptoms in setting of unrevealing neuro exam today. Discussed with patient and amenable with plan. Neurology    Patient seen evaluated bedside with neuro attending Dr Tom, who will attest prior day H&P with today's plan. Patient reports symptoms have remained resolved while being here. Given neuro exam is grossly unrevealing now, will obtain MRI brain while inpatient and defer C/T/L spine after discussion with neuro attending. Will therefore hold off on steroids/ antiplatelet agents given unclear etiology of resolved symptoms in setting of unrevealing neuro exam today. Discussed with patient and amenable with plan. Explained patient care again with  [phone] and answered all her questions. Reviewed bloodwork as well.

## 2023-05-07 ENCOUNTER — TRANSCRIPTION ENCOUNTER (OUTPATIENT)
Age: 38
End: 2023-05-07

## 2023-05-07 VITALS
TEMPERATURE: 98 F | SYSTOLIC BLOOD PRESSURE: 121 MMHG | RESPIRATION RATE: 18 BRPM | HEART RATE: 74 BPM | OXYGEN SATURATION: 97 % | DIASTOLIC BLOOD PRESSURE: 77 MMHG

## 2023-05-07 LAB
24R-OH-CALCIDIOL SERPL-MCNC: 20.9 NG/ML — LOW (ref 30–80)
ANION GAP SERPL CALC-SCNC: 12 MMOL/L — SIGNIFICANT CHANGE UP (ref 5–17)
BUN SERPL-MCNC: 6 MG/DL — LOW (ref 7–23)
CALCIUM SERPL-MCNC: 9.8 MG/DL — SIGNIFICANT CHANGE UP (ref 8.4–10.5)
CHLORIDE SERPL-SCNC: 105 MMOL/L — SIGNIFICANT CHANGE UP (ref 96–108)
CO2 SERPL-SCNC: 22 MMOL/L — SIGNIFICANT CHANGE UP (ref 22–31)
CREAT SERPL-MCNC: 0.6 MG/DL — SIGNIFICANT CHANGE UP (ref 0.5–1.3)
EGFR: 120 ML/MIN/1.73M2 — SIGNIFICANT CHANGE UP
GLUCOSE SERPL-MCNC: 92 MG/DL — SIGNIFICANT CHANGE UP (ref 70–99)
HCT VFR BLD CALC: 37.6 % — SIGNIFICANT CHANGE UP (ref 34.5–45)
HGB BLD-MCNC: 11.7 G/DL — SIGNIFICANT CHANGE UP (ref 11.5–15.5)
MAGNESIUM SERPL-MCNC: 2.1 MG/DL — SIGNIFICANT CHANGE UP (ref 1.6–2.6)
MCHC RBC-ENTMCNC: 24.9 PG — LOW (ref 27–34)
MCHC RBC-ENTMCNC: 31.1 GM/DL — LOW (ref 32–36)
MCV RBC AUTO: 80 FL — SIGNIFICANT CHANGE UP (ref 80–100)
NRBC # BLD: 0 /100 WBCS — SIGNIFICANT CHANGE UP (ref 0–0)
PHOSPHATE SERPL-MCNC: 4.6 MG/DL — HIGH (ref 2.5–4.5)
PLATELET # BLD AUTO: 209 K/UL — SIGNIFICANT CHANGE UP (ref 150–400)
POTASSIUM SERPL-MCNC: 4.6 MMOL/L — SIGNIFICANT CHANGE UP (ref 3.5–5.3)
POTASSIUM SERPL-SCNC: 4.6 MMOL/L — SIGNIFICANT CHANGE UP (ref 3.5–5.3)
RBC # BLD: 4.7 M/UL — SIGNIFICANT CHANGE UP (ref 3.8–5.2)
RBC # FLD: SIGNIFICANT CHANGE UP (ref 10.3–14.5)
SODIUM SERPL-SCNC: 139 MMOL/L — SIGNIFICANT CHANGE UP (ref 135–145)
WBC # BLD: 8.48 K/UL — SIGNIFICANT CHANGE UP (ref 3.8–10.5)
WBC # FLD AUTO: 8.48 K/UL — SIGNIFICANT CHANGE UP (ref 3.8–10.5)

## 2023-05-07 PROCEDURE — 82947 ASSAY GLUCOSE BLOOD QUANT: CPT

## 2023-05-07 PROCEDURE — 85610 PROTHROMBIN TIME: CPT

## 2023-05-07 PROCEDURE — 84100 ASSAY OF PHOSPHORUS: CPT

## 2023-05-07 PROCEDURE — 97165 OT EVAL LOW COMPLEX 30 MIN: CPT

## 2023-05-07 PROCEDURE — 70553 MRI BRAIN STEM W/O & W/DYE: CPT | Mod: MA

## 2023-05-07 PROCEDURE — 82962 GLUCOSE BLOOD TEST: CPT

## 2023-05-07 PROCEDURE — 84132 ASSAY OF SERUM POTASSIUM: CPT

## 2023-05-07 PROCEDURE — 82435 ASSAY OF BLOOD CHLORIDE: CPT

## 2023-05-07 PROCEDURE — 70496 CT ANGIOGRAPHY HEAD: CPT | Mod: MA

## 2023-05-07 PROCEDURE — 97161 PT EVAL LOW COMPLEX 20 MIN: CPT

## 2023-05-07 PROCEDURE — 70450 CT HEAD/BRAIN W/O DYE: CPT | Mod: MA

## 2023-05-07 PROCEDURE — 80053 COMPREHEN METABOLIC PANEL: CPT

## 2023-05-07 PROCEDURE — 85025 COMPLETE CBC W/AUTO DIFF WBC: CPT

## 2023-05-07 PROCEDURE — 36415 COLL VENOUS BLD VENIPUNCTURE: CPT

## 2023-05-07 PROCEDURE — 85018 HEMOGLOBIN: CPT

## 2023-05-07 PROCEDURE — 86140 C-REACTIVE PROTEIN: CPT

## 2023-05-07 PROCEDURE — 82330 ASSAY OF CALCIUM: CPT

## 2023-05-07 PROCEDURE — 82803 BLOOD GASES ANY COMBINATION: CPT

## 2023-05-07 PROCEDURE — 84295 ASSAY OF SERUM SODIUM: CPT

## 2023-05-07 PROCEDURE — 99232 SBSQ HOSP IP/OBS MODERATE 35: CPT | Mod: GC

## 2023-05-07 PROCEDURE — 85014 HEMATOCRIT: CPT

## 2023-05-07 PROCEDURE — 83735 ASSAY OF MAGNESIUM: CPT

## 2023-05-07 PROCEDURE — 96374 THER/PROPH/DIAG INJ IV PUSH: CPT

## 2023-05-07 PROCEDURE — 70498 CT ANGIOGRAPHY NECK: CPT | Mod: MA

## 2023-05-07 PROCEDURE — 84484 ASSAY OF TROPONIN QUANT: CPT

## 2023-05-07 PROCEDURE — 80048 BASIC METABOLIC PNL TOTAL CA: CPT

## 2023-05-07 PROCEDURE — 85730 THROMBOPLASTIN TIME PARTIAL: CPT

## 2023-05-07 PROCEDURE — 70553 MRI BRAIN STEM W/O & W/DYE: CPT | Mod: 26

## 2023-05-07 PROCEDURE — 0042T: CPT | Mod: MA

## 2023-05-07 PROCEDURE — 99285 EMERGENCY DEPT VISIT HI MDM: CPT | Mod: 25

## 2023-05-07 PROCEDURE — 85027 COMPLETE CBC AUTOMATED: CPT

## 2023-05-07 PROCEDURE — 83605 ASSAY OF LACTIC ACID: CPT

## 2023-05-07 PROCEDURE — 80307 DRUG TEST PRSMV CHEM ANLYZR: CPT

## 2023-05-07 PROCEDURE — 82306 VITAMIN D 25 HYDROXY: CPT

## 2023-05-07 NOTE — DISCHARGE NOTE PROVIDER - NSDCCPCAREPLAN_GEN_ALL_CORE_FT
PRINCIPAL DISCHARGE DIAGNOSIS  Diagnosis: Sensory disturbance  Assessment and Plan of Treatment: You were admitted to the neurology service for evaluation of recurrent transient sensory symptoms. MRI brain with and without contrast was performed and determined to be unrevealing. You expressed concern that your symptoms might be related to anxiety, and should follow up with your PCP for further evaluation. You should also follow up with the neurology resident clinic as instructed. Please call the number provided to schedule an appointment. Please call your doctor and return to the ED if you notice new or worsening symptoms, including difficulty speaking or swallowing, changes in vision, or weakness of one side of the body.

## 2023-05-07 NOTE — DISCHARGE NOTE PROVIDER - HOSPITAL COURSE
Admission HPI:  Patient ANY ALLRED is a 37y (1985) woman with a PMHx significant for anemia has presented to the ED as a code stroke for L sided paresthesias and L lower facial heaviness. PT states she noticed at 9 AM 5/5/23 (LKW) that her left hand went numb for 5 minutes after which it resolved. Then at 2 PM, she states she felt the left side of her face being heavy and since then had this symptom. In addition, since around that time has had LLE numbness. Pt denies any recent HA, n/v, changes in vision/hearing, trauma. Not on AC/AP at home. No hx of headaches. Pt is not a tpa candidate as her symptoms are nondisabling and pt outside tenecteplase window.     Of note, pt states that she had similar symptoms in the past for which she went to [facility] and has CT head there (reportedly negative, per her). Pt states she never had an MRI of her brain.     NIH 2 (L lower facial droop- mild, numbness left side)  preMRS 0 (05 May 2023 16:50)      IMAGING/STUDIES    MR Head w/wo IV Cont (05.07.23 @ 10:21)    FINDINGS:  There is a small abnormal T2/FLAIR white matter hyperintensity along the   frontal horn of the left lateral ventricle. No additional white matter   lesions are appreciated. There is no associated restricted diffusion or   postcontrast enhancement.    There is no acute infarct. There is no evidence of mass or intracranial   hemorrhage. There is no abnormal enhancement within the brain. Ventricles   and sulci are normal in size and configuration for the patient's stated   age. No midline shift or other significant mass effect is noted.   Gray-white differentiation is maintained throughout. Normal flow voids   are maintained in the dominant arterial and venous structures.    The visualized paranasal sinuses and tympanomastoid spaces are clear.   Orbits and orbital contents are unremarkable.    IMPRESSION:  Small T2/FLAIR hyperintense lesion along the frontal horn of the left   lateral ventricle, nonspecific, although most concerning for a   demyelinating process. No associated restricted diffusion or postcontrast   enhancement.      HOSPITAL COURSE:  The patient was admitted to the neurology service for further evaluation. MRI brain with and without contrast was obtained without findings consistent with the presenting symptoms. Vitamin D level was sent and pending at time of discharge, and will be followed up outpatient. She was evaluated by PT/OT and determined to be independent, without skilled needs. Patient is currently at baseline and is cleared for discharge home with outpatient followup with her PCP and the resident neurology clinic. Admission HPI:  Patient ANY ALLRED is a 37y (1985) woman with a PMHx significant for anemia has presented to the ED as a code stroke for L sided paresthesias and L lower facial heaviness. PT states she noticed at 9 AM 5/5/23 (LKW) that her left hand went numb for 5 minutes after which it resolved. Then at 2 PM, she states she felt the left side of her face being heavy and since then had this symptom. In addition, since around that time has had LLE numbness. Pt denies any recent HA, n/v, changes in vision/hearing, trauma. Not on AC/AP at home. No hx of headaches. Pt is not a tpa candidate as her symptoms are nondisabling and pt outside tenecteplase window.     Of note, pt states that she had similar symptoms in the past for which she went to [facility] and has CT head there (reportedly negative, per her). Pt states she never had an MRI of her brain.     NIH 2 (L lower facial droop- mild, numbness left side)  preMRS 0 (05 May 2023 16:50)      IMAGING/STUDIES    MR Head w/wo IV Cont (05.07.23 @ 10:21)    FINDINGS:  There is a small abnormal T2/FLAIR white matter hyperintensity along the   frontal horn of the left lateral ventricle. No additional white matter   lesions are appreciated. There is no associated restricted diffusion or   postcontrast enhancement.    There is no acute infarct. There is no evidence of mass or intracranial   hemorrhage. There is no abnormal enhancement within the brain. Ventricles   and sulci are normal in size and configuration for the patient's stated   age. No midline shift or other significant mass effect is noted.   Gray-white differentiation is maintained throughout. Normal flow voids   are maintained in the dominant arterial and venous structures.    The visualized paranasal sinuses and tympanomastoid spaces are clear.   Orbits and orbital contents are unremarkable.    IMPRESSION:  Small T2/FLAIR hyperintense lesion along the frontal horn of the left   lateral ventricle, nonspecific, although most concerning for a   demyelinating process. No associated restricted diffusion or postcontrast   enhancement.      HOSPITAL COURSE:  The patient was admitted to the neurology service for further evaluation. MRI brain with and without contrast was obtained without findings consistent with the presenting symptoms. Vitamin D level was sent and pending at time of discharge, and will be followed up outpatient. She was evaluated by PT/OT and determined to be independent, without skilled needs. Patient is currently at baseline and is cleared for discharge home with outpatient followup with her PCP and the resident neurology clinic.      Patient was seen and examined at bedside. During follow up visit, patient's boyfriend was present. I appreciate that. As per patient, yesterday she has brief transient episode for feeling hot at face and cold at bilateral hands & feet  last for 2-3 minutes. On further questioning she confirmed that she had similar events in past and she was told that she might have panic attacks.   General: Patient is comfortably lying on the bed without acute distress.  Mental and Psychological Status: The patient is alert and oriented to person, place and exact date. Follows simple and complex commands. Speech is fluent, comprehension, naming and repetition are intact. Relates personal medical history clearly and with detail.  Cranial Nerve Exam: Visual fields are full to confrontation. Pupils are round, equal, and reactive. Extraocular movements are full. V1-V3 are intact to light touch. There is no facial weakness or asymmetry. Hearing is grossly intact. Palate elevation is symmetric. Shoulder shrug is 5/5 bilaterally. Tongue protrusion is midline. There is no dysarthria.  Motor Exam: Bulk, tone, and strength are normal. There is no pronator drift. There are no resting tremors.  Sensory Examination: Sensation is intact to light touch throughout.  Deep Tendon Reflexes and Plantar Responses: 2+ throughout.   Posture, Station and Gait: Station and gait, including heel and toe walking are normal. Posture are normal.  Coordination: There is no dysmetria or ataxia with finger-nose-finger or heel-knee-shin. No intention tremors are present.  Ms. Allred is a 37 year old right handed woman with a PMHx significant for anemia has presented to the ED for bilateral hands feels cold and face was feeling hot and transient episode of L lower facial heaviness. Etiology of these symptoms remains uncertain. Again, clinically less suspicious for stroke or Demyelinating disease.  MRI brain to my eye shows small isolated nonspecific abnormal signal at frontal horn of the left   lateral ventricle.   Outpatient MRI C/T spine if she having recurrent and prolong symptoms.   Will check VId D level and follow up with PCP.   Continue medical management, neuro- check and fall precaution.  GI and DVT prophylaxis  Outpatient neurology clinic follow up and PCP for pending lab results.  I discussed the diagnosis, treatment plan with the patient. All questions and concerns were addressed. The patient demonstrated good understanding of the treatment plan.   Gary Tom MD

## 2023-05-07 NOTE — DISCHARGE NOTE NURSING/CASE MANAGEMENT/SOCIAL WORK - PATIENT PORTAL LINK FT
You can access the FollowMyHealth Patient Portal offered by [facility] by registering at the following website: [email or web address]. By joining [facility]’s FollowMyHealth portal, you will also be able to view your health information using other applications (apps) compatible with our system.

## 2023-05-07 NOTE — DISCHARGE NOTE PROVIDER - NSDCQMPCI_CARD_ALL_CORE
Refill request    amphetamine-dextroamphetamine (ADDERALL XR) 20 MG 1 cap daily #30 refill 0  lrf 6/9/17  lov 6/5/17    Ok for refill? Please review PDMP and sign   No

## 2023-05-07 NOTE — DISCHARGE NOTE PROVIDER - NSFOLLOWUPCLINICS_GEN_ALL_ED_FT
[facility]  Neurology  [address] [place] [address] [address] [place] [address]  Phone: [phone]  Fax:   Follow Up Time: 2 weeks

## 2023-05-07 NOTE — DISCHARGE NOTE NURSING/CASE MANAGEMENT/SOCIAL WORK - NSDCPEFALRISK_GEN_ALL_CORE
For information on Fall & Injury Prevention, visit: [email or web address] OR  [email or web address] OR  https://www.cdc.gov/steadi/patient.html

## 2023-05-07 NOTE — PROGRESS NOTE ADULT - ATTENDING COMMENTS
Patient was seen and examined at bedside. During follow up visit, patient's boyfriend was present. I appreciate that. As per patient, yesterday she has brief transient episode for feeling hot at face and cold at bilateral hands & feet  last for 2-3 minutes. On further questioning she confirmed that she had similar events in past and she was told that she might have panic attacks.   General: Patient is comfortably lying on the bed without acute distress.  Mental and Psychological Status: The patient is alert and oriented to person, place and exact date. Follows simple and complex commands. Speech is fluent, comprehension, naming and repetition are intact. Relates personal medical history clearly and with detail.  Cranial Nerve Exam: Visual fields are full to confrontation. Pupils are round, equal, and reactive. Extraocular movements are full. V1-V3 are intact to light touch. There is no facial weakness or asymmetry. Hearing is grossly intact. Palate elevation is symmetric. Shoulder shrug is 5/5 bilaterally. Tongue protrusion is midline. There is no dysarthria.  Motor Exam: Bulk, tone, and strength are normal. There is no pronator drift. There are no resting tremors.  Sensory Examination: Sensation is intact to light touch throughout.  Deep Tendon Reflexes and Plantar Responses: 2+ throughout.   Posture, Station and Gait: Station and gait, including heel and toe walking are normal. Posture are normal.  Coordination: There is no dysmetria or ataxia with finger-nose-finger or heel-knee-shin. No intention tremors are present.  Ms. Lima is a 37 year old right handed woman with a PMHx significant for anemia has presented to the ED for bilateral hands feels cold and face was feeling hot and transient episode of L lower facial heaviness. Etiology of these symptoms remains uncertain. Again, clinically less suspicious for stroke or Demyelinating disease.  MRI brain to my eye shows small isolated nonspecific abnormal signal at frontal horn of the left   lateral ventricle.   Outpatient MRI C/T spine if she having recurrent and prolong symptoms.   Will check Vitamin D level and follow up with PCP.   Continue medical management, neuro- check and fall precaution.  GI and DVT prophylaxis  Outpatient neurology clinic follow up.  I discussed the diagnosis, treatment plan with the patient. All questions and concerns were addressed. The patient demonstrated good understanding of the treatment plan.

## 2023-05-07 NOTE — PROGRESS NOTE ADULT - SUBJECTIVE AND OBJECTIVE BOX
SUBJECTIVE/INTERVAL HISTORY:  ***    ROS: As above, otherwise negative.       MEDICATIONS:  MEDICATIONS  (STANDING):  enoxaparin Injectable 40 milliGRAM(s) SubCutaneous every 24 hours    MEDICATIONS  (PRN):      VITALS & EXAMINATION:  Vital Signs Last 24 Hrs  T(C): 36.9 (07 May 2023 05:43), Max: 37.1 (06 May 2023 17:00)  T(F): 98.5 (07 May 2023 05:43), Max: 98.8 (06 May 2023 17:00)  HR: 67 (07 May 2023 05:43) (67 - 85)  BP: 116/80 (07 May 2023 05:43) (110/75 - 135/90)  RR: 18 (07 May 2023 05:43) (16 - 18)  SpO2: 98% (07 May 2023 05:43) (95% - 100%)    Parameters below as of 07 May 2023 05:43  Patient On (Oxygen Delivery Method): room air      ***    LABS:  CBC                       11.7   8.48  )-----------( 209      ( 07 May 2023 05:56 )             37.6     Chem 05-07    139  |  105  |  6<L>  ----------------------------<  92  4.6   |  22  |  0.60    Ca    9.8      07 May 2023 05:56  Phos  4.6     05-07  Mg     2.1     05-07    TPro  7.9  /  Alb  4.9  /  TBili  0.2  /  DBili  x   /  AST  15  /  ALT  17  /  AlkPhos  57  05-05    LFTs LIVER FUNCTIONS - ( 05 May 2023 15:48 )  Alb: 4.9 g/dL / Pro: 7.9 g/dL / ALK PHOS: 57 U/L / ALT: 17 U/L / AST: 15 U/L / GGT: x           Coagulopathy PT/INR - ( 05 May 2023 15:48 )   PT: 13.5 sec;   INR: 1.16 ratio         PTT - ( 05 May 2023 15:48 )  PTT:30.7 sec  Lipid Panel   A1c   Cardiac enzymes       STUDIES & IMAGING:    Studies (EKG, EEG, EMG, etc):       Radiology (XR, CT, MR, U/S, TTE/ISMAEL):     SUBJECTIVE/INTERVAL HISTORY:  Patient seen and evaluated at bedside this AM. Overnight she reports a transient episode of a sensation of coldness in her fingers and feet bilaterally, lasting about 3-4 minutes. States she is concerned this may be due to anxiety, perhaps related to planning her daughter's 15th birthday (sherie). Feels she is now at baseline and does not have new complaints at this time.     ROS: As above, otherwise negative.       MEDICATIONS:  MEDICATIONS  (STANDING):  enoxaparin Injectable 40 milliGRAM(s) SubCutaneous every 24 hours    MEDICATIONS  (PRN):      VITALS & EXAMINATION:  Vital Signs Last 24 Hrs  T(C): 36.9 (07 May 2023 05:43), Max: 37.1 (06 May 2023 17:00)  T(F): 98.5 (07 May 2023 05:43), Max: 98.8 (06 May 2023 17:00)  HR: 67 (07 May 2023 05:43) (67 - 85)  BP: 116/80 (07 May 2023 05:43) (110/75 - 135/90)  RR: 18 (07 May 2023 05:43) (16 - 18)  SpO2: 98% (07 May 2023 05:43) (95% - 100%)    Parameters below as of 07 May 2023 05:43  Patient On (Oxygen Delivery Method): room air    General:   Constitutional - NAD  Cardiovascular - Peripheral pulses palpable, no edema  Respiratory: Breathing comfortably on room air.     Neurologic Exam:  Mental status - Awake, Alert, Oriented to person, place, and time. Follows simple and complex commands. Attention/concentration, recent and remote memory (including registration and recall), and fund of knowledge intact  Speech/Language: fluent, repetition and naming intact.   Cranial nerves - PERRL, VFF, EOMI, facial sensation intact,  facial movements normal and symmetric, hearing intact b/l, palate with symmetric elevation, sternocleidomastiod 5/5 strength b/l, tongue midline on protrusion with normal movements  Motor - Normal bulk and tone throughout. No pronator drift. Strength full in the upper and lower extremities bilaterally.  Sensation - Intact to light touch in the UE/LE b/l.  Coordination - Finger to Nose intact b/l. No tremors appreciated  Gait and station - Normal casual gait.      LABS:  CBC                       11.7   8.48  )-----------( 209      ( 07 May 2023 05:56 )             37.6     Chem 05-07    139  |  105  |  6<L>  ----------------------------<  92  4.6   |  22  |  0.60    Ca    9.8      07 May 2023 05:56  Phos  4.6     05-07  Mg     2.1     05-07    TPro  7.9  /  Alb  4.9  /  TBili  0.2  /  DBili  x   /  AST  15  /  ALT  17  /  AlkPhos  57  05-05    LFTs LIVER FUNCTIONS - ( 05 May 2023 15:48 )  Alb: 4.9 g/dL / Pro: 7.9 g/dL / ALK PHOS: 57 U/L / ALT: 17 U/L / AST: 15 U/L / GGT: x           Coagulopathy PT/INR - ( 05 May 2023 15:48 )   PT: 13.5 sec;   INR: 1.16 ratio     PTT - ( 05 May 2023 15:48 )  PTT:30.7 sec      STUDIES & IMAGING:    Studies (EKG, EEG, EMG, etc):       Radiology (XR, CT, MR, U/S, TTE/ISMAEL):     SUBJECTIVE/INTERVAL HISTORY:  Patient seen and evaluated at bedside this AM. Overnight she reports a transient episode of a sensation of coldness in her fingers and feet bilaterally, lasting about 3-4 minutes. States she is concerned this may be due to anxiety, perhaps related to planning her daughter's 15th birthday (sherie). Feels she is now at baseline and does not have new complaints at this time.     Further collateral:  Of note, patient states that yesterday's recurrence would be the 3rd time she is experiencing these symptoms. She admits to an initial episode about 2 months PTA, at which time she presented to an OSH and was told her symptoms were likely related to anxiety or a panic attack. She describes all episodes as consisting of a sensation of coldness in her hands/feet, warmth in her face, palpitations and an underlying feeling of anxiety. Symptoms usually resolve within 3 minutes.     ROS: As above, otherwise negative.       MEDICATIONS:  MEDICATIONS  (STANDING):  enoxaparin Injectable 40 milliGRAM(s) SubCutaneous every 24 hours    MEDICATIONS  (PRN):      VITALS & EXAMINATION:  Vital Signs Last 24 Hrs  T(C): 36.9 (07 May 2023 05:43), Max: 37.1 (06 May 2023 17:00)  T(F): 98.5 (07 May 2023 05:43), Max: 98.8 (06 May 2023 17:00)  HR: 67 (07 May 2023 05:43) (67 - 85)  BP: 116/80 (07 May 2023 05:43) (110/75 - 135/90)  RR: 18 (07 May 2023 05:43) (16 - 18)  SpO2: 98% (07 May 2023 05:43) (95% - 100%)    Parameters below as of 07 May 2023 05:43  Patient On (Oxygen Delivery Method): room air    General:   Constitutional - NAD  Cardiovascular - Peripheral pulses palpable, no edema  Respiratory: Breathing comfortably on room air.     Neurologic Exam:  Mental status - Awake, Alert, Oriented to person, place, and time. Follows simple and complex commands. Attention/concentration, recent and remote memory (including registration and recall), and fund of knowledge intact  Speech/Language: fluent, repetition and naming intact.   Cranial nerves - PERRL, VFF, EOMI, facial sensation intact,  facial movements normal and symmetric, hearing intact b/l, palate with symmetric elevation, sternocleidomastiod 5/5 strength b/l, tongue midline on protrusion with normal movements  Motor - Normal bulk and tone throughout. No pronator drift. Strength full in the upper and lower extremities bilaterally.  Sensation - Intact to light touch in the UE/LE b/l.  Coordination - Finger to Nose intact b/l. No tremors appreciated  Gait and station - Normal casual gait.      LABS:  CBC                       11.7   8.48  )-----------( 209      ( 07 May 2023 05:56 )             37.6     Chem 05-07    139  |  105  |  6<L>  ----------------------------<  92  4.6   |  22  |  0.60    Ca    9.8      07 May 2023 05:56  Phos  4.6     05-07  Mg     2.1     05-07    TPro  7.9  /  Alb  4.9  /  TBili  0.2  /  DBili  x   /  AST  15  /  ALT  17  /  AlkPhos  57  05-05    LFTs LIVER FUNCTIONS - ( 05 May 2023 15:48 )  Alb: 4.9 g/dL / Pro: 7.9 g/dL / ALK PHOS: 57 U/L / ALT: 17 U/L / AST: 15 U/L / GGT: x           Coagulopathy PT/INR - ( 05 May 2023 15:48 )   PT: 13.5 sec;   INR: 1.16 ratio     PTT - ( 05 May 2023 15:48 )  PTT:30.7 sec      STUDIES & IMAGING:    Studies (EKG, EEG, EMG, etc):       Radiology (XR, CT, MR, U/S, TTE/ISMAEL):

## 2023-05-10 PROBLEM — Z00.00 ENCOUNTER FOR PREVENTIVE HEALTH EXAMINATION: Status: ACTIVE | Noted: 2023-05-10

## 2023-05-10 LAB — DRUG SCREEN, SERUM: SIGNIFICANT CHANGE UP

## 2023-05-17 ENCOUNTER — APPOINTMENT (OUTPATIENT)
Dept: NEUROLOGY | Facility: CLINIC | Age: 38
End: 2023-05-17

## 2023-09-29 ENCOUNTER — APPOINTMENT (OUTPATIENT)
Dept: NEUROLOGY | Facility: CLINIC | Age: 38
End: 2023-09-29